# Patient Record
Sex: MALE | Race: WHITE | NOT HISPANIC OR LATINO | Employment: FULL TIME | URBAN - NONMETROPOLITAN AREA
[De-identification: names, ages, dates, MRNs, and addresses within clinical notes are randomized per-mention and may not be internally consistent; named-entity substitution may affect disease eponyms.]

---

## 2017-01-13 ENCOUNTER — OFFICE VISIT (OUTPATIENT)
Dept: FAMILY MEDICINE CLINIC | Facility: CLINIC | Age: 57
End: 2017-01-13

## 2017-01-13 VITALS
BODY MASS INDEX: 29.86 KG/M2 | DIASTOLIC BLOOD PRESSURE: 90 MMHG | OXYGEN SATURATION: 96 % | HEART RATE: 102 BPM | HEIGHT: 68 IN | WEIGHT: 197 LBS | SYSTOLIC BLOOD PRESSURE: 134 MMHG

## 2017-01-13 DIAGNOSIS — I10 BENIGN HYPERTENSION: ICD-10-CM

## 2017-01-13 DIAGNOSIS — E78.5 HYPERLIPIDEMIA, UNSPECIFIED HYPERLIPIDEMIA TYPE: ICD-10-CM

## 2017-01-13 DIAGNOSIS — E11.9 CONTROLLED TYPE 2 DIABETES MELLITUS WITHOUT COMPLICATION, UNSPECIFIED LONG TERM INSULIN USE STATUS: Primary | ICD-10-CM

## 2017-01-13 DIAGNOSIS — K02.9 DENTAL CARIES: ICD-10-CM

## 2017-01-13 DIAGNOSIS — E66.3 OVERWEIGHT: ICD-10-CM

## 2017-01-13 LAB — HBA1C MFR BLD: 9.2 %

## 2017-01-13 PROCEDURE — 99214 OFFICE O/P EST MOD 30 MIN: CPT | Performed by: FAMILY MEDICINE

## 2017-01-13 PROCEDURE — 83036 HEMOGLOBIN GLYCOSYLATED A1C: CPT | Performed by: FAMILY MEDICINE

## 2017-01-13 RX ORDER — ATORVASTATIN CALCIUM 20 MG/1
20 TABLET, FILM COATED ORAL DAILY
Qty: 30 TABLET | Refills: 12 | Status: SHIPPED | OUTPATIENT
Start: 2017-01-13

## 2017-01-13 NOTE — PROGRESS NOTES
Subjective   Gaurav Mitchell is a 56 y.o. male.     History of Present Illness   56-year-old diabetic.  He was unable to see the nutritionist.  States he never heard from them and so we'll try to correct this.  He is a heavy user of  [id number] and has cut this off completely.  He has tried several diet soft drinks.  He has lost 5 pounds.  His polydipsia has improved.  He is on lisinopril and metformin but not a statin.    Hyperlipidemia.  Have discussed again treatment for this and he is open to it.    Dental caries.  Has made no effort to see the dentist yet.    Hypertension.  Now taking lisinopril 20 mg.  No cough.  Voices no complaints.  The following portions of the patient's history were reviewed and updated as appropriate: allergies, current medications, past family history, past medical history, past social history, past surgical history and problem list.    Review of Systems   Constitutional: Negative for activity change, appetite change, fatigue and unexpected weight change.   HENT: Positive for dental problem.    Respiratory: Negative for cough.    Endocrine: Negative for polydipsia and polyuria.       Objective   Physical Exam   Constitutional: He is oriented to person, place, and time. He appears well-nourished.   HENT:   Right Ear: External ear normal.   Left Ear: External ear normal.   Mouth/Throat: Oropharynx is clear and moist. Abnormal dentition.   Eyes: EOM are normal.   Neck: Neck supple.   Cardiovascular: Normal rate, regular rhythm and normal heart sounds.    Pulmonary/Chest: Effort normal and breath sounds normal.   Neurological: He is alert and oriented to person, place, and time.   Vitals reviewed.      Assessment/Plan   Gaurav was seen today for diabetes.    Diagnoses and all orders for this visit:    Controlled type 2 diabetes mellitus without complication, unspecified long term insulin use status  -     Ambulatory Referral to Nutrition Services  -     Ambulatory Referral to Ophthalmology  -      metFORMIN (GLUCOPHAGE) 1000 MG tablet; Take 1 tablet by mouth 2 (Two) Times a Day With Meals.    Benign hypertension    Overweight    Hyperlipidemia, unspecified hyperlipidemia type  -     atorvastatin (LIPITOR) 20 MG tablet; Take 1 tablet by mouth Daily.    Dental caries      Diabetes.  He has lost 5 pounds in the last 3 months.  A1c today is 9.2.  3 months ago was 10.2.  Referral to nutritionist and ophthalmologist.  Tried to encourage him to avoid the sweets and the soft drinks.  Follow-up 3 months to look at the A1c again.  Next I will add something like glimepiride or Januvia if needed.  I want to get his A1c at least below 8 if possible.    Hypertension.  On lisinopril 20 mg.  Blood pressure is improved somewhat.  No change in medicines yet.    Overweight.  Appointment to see the nutritionist.  Encouragement.    Lipids.  Certainly he needs to be on a statin because his cholesterol levels but also because of his diabetes.  This was explained to him.  He is in agreement to try something.    Dental caries.  Encouraged him to see his dentist.

## 2017-01-30 ENCOUNTER — APPOINTMENT (OUTPATIENT)
Dept: NUTRITION | Facility: HOSPITAL | Age: 57
End: 2017-01-30

## 2017-02-03 ENCOUNTER — APPOINTMENT (OUTPATIENT)
Dept: NUTRITION | Facility: HOSPITAL | Age: 57
End: 2017-02-03
Attending: FAMILY MEDICINE

## 2017-04-14 ENCOUNTER — OFFICE VISIT (OUTPATIENT)
Dept: FAMILY MEDICINE CLINIC | Facility: CLINIC | Age: 57
End: 2017-04-14

## 2017-04-14 VITALS
OXYGEN SATURATION: 96 % | WEIGHT: 194 LBS | HEIGHT: 68 IN | DIASTOLIC BLOOD PRESSURE: 80 MMHG | HEART RATE: 84 BPM | SYSTOLIC BLOOD PRESSURE: 136 MMHG | BODY MASS INDEX: 29.4 KG/M2

## 2017-04-14 DIAGNOSIS — E13.319 RETINOPATHY DUE TO SECONDARY DIABETES (HCC): ICD-10-CM

## 2017-04-14 DIAGNOSIS — E66.3 OVERWEIGHT: ICD-10-CM

## 2017-04-14 DIAGNOSIS — E11.9 CONTROLLED TYPE 2 DIABETES MELLITUS WITHOUT COMPLICATION, UNSPECIFIED LONG TERM INSULIN USE STATUS: Primary | ICD-10-CM

## 2017-04-14 DIAGNOSIS — I10 BENIGN HYPERTENSION: ICD-10-CM

## 2017-04-14 LAB — HBA1C MFR BLD: 8.6 %

## 2017-04-14 PROCEDURE — 83036 HEMOGLOBIN GLYCOSYLATED A1C: CPT | Performed by: FAMILY MEDICINE

## 2017-04-14 PROCEDURE — 99214 OFFICE O/P EST MOD 30 MIN: CPT | Performed by: FAMILY MEDICINE

## 2017-04-14 NOTE — PROGRESS NOTES
Subjective   Gaurav Mitchell is a 56 y.o. male.     History of Present Illness   56-year-old male.  Diabetic.  I have received records from the nutritionist that the patient did not show up for her visit.  He has recently been seen by a local optometrist, Dr. Hale, and then was sent on to a retinal specialist.  Apparently papilledema was seen.  Had an MRI.  Felt to have diabetic retinopathy.  States that his vision has become improved.  It is noted that he has lost some weight.  A1c's have been dropping.  The following portions of the patient's history were reviewed and updated as appropriate: allergies, current medications, past family history, past medical history, past social history, past surgical history and problem list.    Review of Systems   Constitutional: Negative for appetite change, fever and unexpected weight change.   HENT: Negative.    Respiratory: Negative for cough and shortness of breath.    Endocrine: Negative for polydipsia.       Objective   Physical Exam   Constitutional: He is oriented to person, place, and time. He appears well-developed and well-nourished.   HENT:   Head: Normocephalic.   Right Ear: External ear normal.   Left Ear: External ear normal.   Eyes: EOM are normal.   Pulmonary/Chest: Effort normal.   Neurological: He is alert and oriented to person, place, and time.   Psychiatric: He has a normal mood and affect.       Assessment/Plan   Gaurav was seen today for diabetes.    Diagnoses and all orders for this visit:    Controlled type 2 diabetes mellitus without complication, unspecified long term insulin use status  -     POC Glycosylated Hemoglobin (Hb A1C)    Retinopathy due to secondary diabetes    Benign hypertension    Overweight    Diabetes.  A1c is 8.6.  He does not want me to set him up with another appointment to see the nutritionist or diabetic educator.  I spent several minutes reviewing type 2 diabetes, hyperinsulinemia, the importance of diet and exercise.  For now  will continue encouraging him to eat well, lose weight and exercise.  Check an A1c in 3 months.Continue statin, metformin, and lisinopril.    Retinopathy.  His vision is better and hopefully it's because we have gotten his blood sugar under better control.    Hypertension.  Takes an ACE inhibitor.  Blood pressures looks fine.    Overweight.  He has lost 8 pounds and I tried to encourage him.    Office visit 30 minutes.  At least half the time in counseling about health needs and diabetes.

## 2017-07-26 ENCOUNTER — OFFICE VISIT (OUTPATIENT)
Dept: FAMILY MEDICINE CLINIC | Facility: CLINIC | Age: 57
End: 2017-07-26

## 2017-07-26 VITALS
DIASTOLIC BLOOD PRESSURE: 78 MMHG | OXYGEN SATURATION: 96 % | HEIGHT: 68 IN | BODY MASS INDEX: 28.79 KG/M2 | SYSTOLIC BLOOD PRESSURE: 128 MMHG | WEIGHT: 190 LBS | HEART RATE: 77 BPM

## 2017-07-26 DIAGNOSIS — E11.9 CONTROLLED TYPE 2 DIABETES MELLITUS WITHOUT COMPLICATION, UNSPECIFIED LONG TERM INSULIN USE STATUS: Primary | ICD-10-CM

## 2017-07-26 LAB — HBA1C MFR BLD: 8.6 %

## 2017-07-26 PROCEDURE — 99213 OFFICE O/P EST LOW 20 MIN: CPT | Performed by: FAMILY MEDICINE

## 2017-07-26 PROCEDURE — 83036 HEMOGLOBIN GLYCOSYLATED A1C: CPT | Performed by: FAMILY MEDICINE

## 2017-07-26 RX ORDER — ASPIRIN 81 MG/1
81 TABLET ORAL DAILY
COMMUNITY

## 2017-07-26 NOTE — PROGRESS NOTES
Subjective   Gaurav Mitchell is a 56 y.o. male.     History of Present Illness   56-year-old male.  Type II diabetic.  Takes metformin, lisinopril, atorvastatin.  Has lost 4 more pain on's.  3 months ago his A1c was 8.6.  The following portions of the patient's history were reviewed and updated as appropriate: allergies, current medications, past family history, past medical history, past social history, past surgical history and problem list.    Review of Systems   Constitutional: Negative for appetite change, fatigue and unexpected weight change.       Objective   Physical Exam   Constitutional: He is oriented to person, place, and time. He appears well-nourished.   HENT:   Head: Normocephalic.   Eyes: EOM are normal.   Cardiovascular: Normal rate and regular rhythm.    Pulmonary/Chest: Effort normal.   Neurological: He is alert and oriented to person, place, and time.       Assessment/Plan   Diagnoses and all orders for this visit:    Controlled type 2 diabetes mellitus without complication, unspecified long term insulin use status  -     POC Glycosylated Hemoglobin (Hb A1C)      Type II diabetic.  A1c today is 8.6 so it has not changed.  General discussion.  Since his blood pressure is good, he is on lisinopril and a statin, I'm not going to add another medicine such as glimepiride.  Encourage diet exercise.  Repeat A1c in 3 months. would like to get this in the low 8 high sevens at least.
